# Patient Record
Sex: FEMALE | Race: BLACK OR AFRICAN AMERICAN | NOT HISPANIC OR LATINO | URBAN - METROPOLITAN AREA
[De-identification: names, ages, dates, MRNs, and addresses within clinical notes are randomized per-mention and may not be internally consistent; named-entity substitution may affect disease eponyms.]

---

## 2019-03-25 ENCOUNTER — HOSPITAL ENCOUNTER (EMERGENCY)
Facility: OTHER | Age: 64
Discharge: HOME | End: 2019-03-25
Attending: EMERGENCY MEDICINE
Payer: MEDICARE

## 2019-03-25 VITALS
HEIGHT: 65 IN | HEART RATE: 65 BPM | TEMPERATURE: 98 F | DIASTOLIC BLOOD PRESSURE: 78 MMHG | SYSTOLIC BLOOD PRESSURE: 189 MMHG | BODY MASS INDEX: 36.65 KG/M2 | WEIGHT: 220 LBS | RESPIRATION RATE: 19 BRPM | OXYGEN SATURATION: 97 %

## 2019-03-25 DIAGNOSIS — S82.892A CLOSED FRACTURE OF LEFT ANKLE, INITIAL ENCOUNTER: Primary | ICD-10-CM

## 2019-03-25 DIAGNOSIS — R03.0 ELEVATED BLOOD PRESSURE READING: ICD-10-CM
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
## 2019-03-25 PROCEDURE — 99285 EMERGENCY DEPT VISIT HI MDM: CPT | Mod: 25

## 2019-03-25 PROCEDURE — 29515 APPLICATION SHORT LEG SPLINT: CPT | Mod: LT

## 2019-03-25 PROCEDURE — 25000003 PHARM REV CODE 250: Performed by: EMERGENCY MEDICINE

## 2019-03-25 RX ORDER — ONDANSETRON 8 MG/1
8 TABLET, ORALLY DISINTEGRATING ORAL
Status: COMPLETED | OUTPATIENT
Start: 2019-03-25 | End: 2019-03-25

## 2019-03-25 RX ORDER — OXYCODONE AND ACETAMINOPHEN 5; 325 MG/1; MG/1
2 TABLET ORAL
Status: COMPLETED | OUTPATIENT
Start: 2019-03-25 | End: 2019-03-25

## 2019-03-25 RX ORDER — HYDROCODONE BITARTRATE AND ACETAMINOPHEN 5; 325 MG/1; MG/1
1 TABLET ORAL EVERY 4 HOURS PRN
Qty: 18 TABLET | Refills: 0 | Status: SHIPPED | OUTPATIENT
Start: 2019-03-25

## 2019-03-25 RX ADMIN — ONDANSETRON 8 MG: 8 TABLET, ORALLY DISINTEGRATING ORAL at 12:03

## 2019-03-25 RX ADMIN — OXYCODONE HYDROCHLORIDE AND ACETAMINOPHEN 2 TABLET: 5; 325 TABLET ORAL at 10:03

## 2019-03-25 NOTE — ED NOTES
Verbal order: Discontinue crutches per Dr. Ugalde. Pt to perform no weight bearing to LLE using walker per MD. New orders received and implemented.

## 2019-03-25 NOTE — ED PROVIDER NOTES
"Encounter Date: 3/25/2019    SCRIBE #1 NOTE: I, Arabella Guo, am scribing for, and in the presence of, Dr. Ugalde.       History     Chief Complaint   Patient presents with    Ankle Pain     slip and fall with hyperextension L foot, pain and localized edema to L ankle, says she heard a "pop"     Time seen by provider: 8:46 AM    This is a 64 y.o. female who presents with complaint of L ankle and foot pain for the past few hours. Pt states that she was sleeping on an air mattress on the ground and slipped when getting up. She states that her L leg slipped behind her and that she heard a "pop." There is pain across the dorsal aspect of the foot, the toes, and the lateral ankle, exacerbated with movement and bending the knee. Pt states that she has chronic pain to the legs from varicose veins. She has not taken any medications for relief. She denies any numbness, weakness, tingling, and wounds. She denies any head trauma or LOC.    The history is provided by the patient.     Review of patient's allergies indicates:   Allergen Reactions    Iodine and iodide containing products      No past medical history on file.  No past surgical history on file.  No family history on file.  Social History     Tobacco Use    Smoking status: Not on file   Substance Use Topics    Alcohol use: Not on file    Drug use: Not on file     Review of Systems   Constitutional: Negative for chills and fever.   Gastrointestinal: Negative for nausea and vomiting.   Musculoskeletal: Negative for back pain and neck pain.        Positive for L ankle and foot pain.   Skin: Negative for rash and wound.   Allergic/Immunologic: Negative for immunocompromised state.   Neurological: Negative for dizziness, weakness and numbness.        Negative for tingling.   Hematological: Does not bruise/bleed easily.       Physical Exam     Initial Vitals [03/25/19 0828]   BP Pulse Resp Temp SpO2   (!) 176/79 75 (!) 22 99.3 °F (37.4 °C) 98 %      MAP     "   --         Physical Exam    Nursing note and vitals reviewed.  Constitutional: She appears well-developed and well-nourished. She is not diaphoretic. No distress.   HENT:   Head: Normocephalic and atraumatic.   Right Ear: External ear normal.   Left Ear: External ear normal.   Eyes: Right eye exhibits no discharge. Left eye exhibits no discharge.   Neck: Normal range of motion. Neck supple.   Pulmonary/Chest: No respiratory distress.   Musculoskeletal: Normal range of motion.   LLE: No tenderness to proximal tibial fibular region. Swelling to both malleoli on L. Tenderness of lateral malleolus on L. No joint instability. Tenderness to dorsum of distal metatarsal region. Symmetric capillary refill and normal perfusion.    Neurological: She is alert and oriented to person, place, and time.   Skin: Skin is warm and dry. No rash noted. No erythema.   Psychiatric: She has a normal mood and affect. Her behavior is normal.         ED Course   Procedures  Labs Reviewed - No data to display       Imaging Results          X-Ray Tibia Fibula 2 View Left (Final result)  Result time 03/25/19 09:42:43    Final result by Danyell Mcfarlane MD (03/25/19 09:42:43)                 Impression:      Medial malleolar nondisplaced avulsion fracture.  Otherwise normal exam.      Electronically signed by: Danyell Mcfarlane MD  Date:    03/25/2019  Time:    09:42             Narrative:    EXAMINATION:  XR TIBIA FIBULA 2 VIEW LEFT    CLINICAL HISTORY:  Unspecified fall, initial encounter    TECHNIQUE:  AP and lateral views of the left tibia and fibula were performed.    COMPARISON:  None.    FINDINGS:  Evaluation of the left tibia and fibula again demonstrates the avulsion fracture at the base of the medial malleolus with mild associated soft tissue swelling.  Otherwise, the osseous structures, soft tissues and joint spaces are normal.                               X-Ray Foot Complete Left (Final result)  Result time 03/25/19 09:43:59     Final result by Kvng Pa MD (03/25/19 09:43:59)                 Impression:      Linear calcification identified at the dorsal aspect of the anterior talus likely representing a small avulsion fracture with associated soft tissue swelling.    Calcaneal spurs.      Electronically signed by: Kvng Pa MD  Date:    03/25/2019  Time:    09:43             Narrative:    EXAMINATION:  XR FOOT COMPLETE 3 VIEW LEFT    CLINICAL HISTORY:  .  Unspecified fall, initial encounter    TECHNIQUE:  AP, lateral and oblique views of the left foot were performed.    COMPARISON:  None    FINDINGS:  There is a thin linear calcification adjacent to the anterior aspect of the talus noted on the lateral radiograph only.  There appears to be soft tissue swelling in this region and a small avulsion fracture of the dorsal aspect of the anterior talus is suspected.  The remainder of the bones appear intact.  There is no evidence for dislocation.  There are small calcaneal spurs at the insertions of the Achilles tendon and plantar fascia.  Atherosclerotic calcification is identified within the arteries at the level of the ankle.                               X-Ray Ankle Complete Left (Final result)  Result time 03/25/19 09:00:56    Final result by Louie Rudolph MD (03/25/19 09:00:56)                 Impression:      There is mild diffuse soft tissue swelling about the ankle.  Small bony fragment at the tip of the medial malleolus may represent a small avulsion fragment versus a remote healed injury.      Electronically signed by: Louie Rudolph MD  Date:    03/25/2019  Time:    09:00             Narrative:    EXAMINATION:  XR ANKLE COMPLETE 3 VIEW LEFT    CLINICAL HISTORY:  Unspecified fall, initial encounter    TECHNIQUE:  AP, lateral and oblique views of the left ankle were performed.    COMPARISON:  None    FINDINGS:  There is mild diffuse soft tissue swelling about the ankle.  Small bony fragment at the tip of the medial malleolus may  "represent a small avulsion fragment versus a remote healed injury.  Bony spurring is present along the calcaneus.                              X-Rays:   Independently Interpreted Readings:   Other Readings:  X-Ray Ankle Complete Left: No obvious fracture or dislocation.  X-Ray Foot Complete Left: No obvious fracture or dislocation.  X-Ray Tibia Fibula 2 View Left: No obvious fracture or dislocation.    Medical Decision Making:   Initial Assessment:   65 yo F here with pain to the L ankle sp twisting injury this am. Pt reports "popping" sensation that occured when it "bent behind her" while slipping on attempts to get out of an inflatable bed. Pt denies head trauma, pain located to lateral aspect of L ankle. On exam pt has tenderness and swelling to lateral malleolus, and distal metatarsals with nml distal perfusion and neuro exam. Will obtain imaging, analgesia and reassess.   Independently Interpreted Test(s):   I have ordered and independently interpreted X-rays - see prior notes.  Clinical Tests:   Radiological Study: Ordered and Reviewed  ED Management:  XR w medial mal avulsion fx andpossible talus avulsion fx.    Pt initially placed in splint, but unable to coordinate crutch use with safe mobility. Pt then placed in walking boot with plan for minimal weight bearing and encouraged to rent a wheelchair, and follow with Orthopedics upon return home to [place], RICE education given.             Scribe Attestation:   Scribe #1: I performed the above scribed service and the documentation accurately describes the services I performed. I attest to the accuracy of the note.    Attending Attestation:           Physician Attestation for Scribe:  Physician Attestation Statement for Scribe #1: I, Dr. Ugalde, reviewed documentation, as scribed by Arabella Guo in my presence, and it is both accurate and complete.                    Clinical Impression:     1. Closed fracture of left ankle, initial encounter    2. Fall  "   3. Elevated blood pressure reading          Disposition:   Disposition: Discharged  Condition: Sade Ugalde MD  03/25/19 3288

## 2022-06-07 ENCOUNTER — OFFICE VISIT (OUTPATIENT)
Dept: URGENT CARE | Facility: CLINIC | Age: 67
End: 2022-06-07
Payer: MEDICARE

## 2022-06-07 VITALS
OXYGEN SATURATION: 96 % | DIASTOLIC BLOOD PRESSURE: 90 MMHG | RESPIRATION RATE: 16 BRPM | HEIGHT: 65 IN | HEART RATE: 84 BPM | WEIGHT: 220 LBS | TEMPERATURE: 99 F | BODY MASS INDEX: 36.65 KG/M2 | SYSTOLIC BLOOD PRESSURE: 140 MMHG

## 2022-06-07 DIAGNOSIS — M62.838 MUSCLE SPASM: ICD-10-CM

## 2022-06-07 DIAGNOSIS — M25.572 PAIN IN LEFT ANKLE AND JOINTS OF LEFT FOOT: ICD-10-CM

## 2022-06-07 DIAGNOSIS — Z87.81 HISTORY OF FRACTURE OF LEFT ANKLE: ICD-10-CM

## 2022-06-07 DIAGNOSIS — M54.42 ACUTE LEFT-SIDED LOW BACK PAIN WITH LEFT-SIDED SCIATICA: Primary | ICD-10-CM

## 2022-06-07 DIAGNOSIS — Z87.81 HISTORY OF FRACTURE OF FOOT: ICD-10-CM

## 2022-06-07 PROCEDURE — 96372 PR INJECTION,THERAP/PROPH/DIAG2ST, IM OR SUBCUT: ICD-10-PCS | Mod: S$GLB,,, | Performed by: PHYSICIAN ASSISTANT

## 2022-06-07 PROCEDURE — 99203 PR OFFICE/OUTPT VISIT, NEW, LEVL III, 30-44 MIN: ICD-10-PCS | Mod: 25,S$GLB,, | Performed by: PHYSICIAN ASSISTANT

## 2022-06-07 PROCEDURE — 99203 OFFICE O/P NEW LOW 30 MIN: CPT | Mod: 25,S$GLB,, | Performed by: PHYSICIAN ASSISTANT

## 2022-06-07 PROCEDURE — 96372 THER/PROPH/DIAG INJ SC/IM: CPT | Mod: S$GLB,,, | Performed by: PHYSICIAN ASSISTANT

## 2022-06-07 RX ORDER — DULOXETIN HYDROCHLORIDE 30 MG/1
30 CAPSULE, DELAYED RELEASE ORAL DAILY
COMMUNITY
Start: 2022-03-25

## 2022-06-07 RX ORDER — METOPROLOL TARTRATE 100 MG/1
100 TABLET ORAL 2 TIMES DAILY
COMMUNITY
Start: 2022-03-25

## 2022-06-07 RX ORDER — HYDROCHLOROTHIAZIDE 25 MG/1
25 TABLET ORAL DAILY
COMMUNITY
Start: 2022-03-25

## 2022-06-07 RX ORDER — DIAZEPAM 10 MG/1
10 TABLET ORAL NIGHTLY
COMMUNITY
Start: 2022-04-28

## 2022-06-07 RX ORDER — HYDRALAZINE HYDROCHLORIDE 100 MG/1
100 TABLET, FILM COATED ORAL 2 TIMES DAILY
COMMUNITY
Start: 2022-04-09

## 2022-06-07 RX ORDER — FLUTICASONE PROPIONATE 50 MCG
1 SPRAY, SUSPENSION (ML) NASAL
COMMUNITY

## 2022-06-07 RX ORDER — TRAVOPROST OPHTHALMIC SOLUTION 0.04 MG/ML
1 SOLUTION OPHTHALMIC NIGHTLY
COMMUNITY
Start: 2022-05-22

## 2022-06-07 RX ORDER — LISINOPRIL 40 MG/1
40 TABLET ORAL DAILY
COMMUNITY
Start: 2022-03-25

## 2022-06-07 RX ORDER — TIZANIDINE 2 MG/1
4 TABLET ORAL EVERY 8 HOURS PRN
COMMUNITY
Start: 2022-03-25

## 2022-06-07 RX ORDER — TIZANIDINE 4 MG/1
4 TABLET ORAL
Qty: 20 TABLET | Refills: 0 | Status: SHIPPED | OUTPATIENT
Start: 2022-06-07 | End: 2022-06-17

## 2022-06-07 RX ORDER — KETOROLAC TROMETHAMINE 30 MG/ML
30 INJECTION, SOLUTION INTRAMUSCULAR; INTRAVENOUS
Status: COMPLETED | OUTPATIENT
Start: 2022-06-07 | End: 2022-06-07

## 2022-06-07 RX ORDER — NAPROXEN 500 MG/1
500 TABLET ORAL 2 TIMES DAILY PRN
Qty: 28 TABLET | Refills: 0 | Status: SHIPPED | OUTPATIENT
Start: 2022-06-08 | End: 2022-06-22

## 2022-06-07 RX ORDER — AZELASTINE HYDROCHLORIDE, FLUTICASONE PROPIONATE 137; 50 UG/1; UG/1
1 SPRAY, METERED NASAL
COMMUNITY

## 2022-06-07 RX ADMIN — KETOROLAC TROMETHAMINE 30 MG: 30 INJECTION, SOLUTION INTRAMUSCULAR; INTRAVENOUS at 04:06

## 2022-06-07 NOTE — PATIENT INSTRUCTIONS
PLEASE READ YOUR DISCHARGE INSTRUCTIONS ENTIRELY AS IT CONTAINS IMPORTANT INFORMATION.  - OTC Tylenol/anti-inflammatory as needed for pain (see below). These medications can be obtained over the counter at any local pharmacy without requiring a prescription.   OTC ORAL medications for pain reliever or fever reducing:  - Acetaminophen (tylenol 650mg every 8 hour as needed with food) or Ibuprofen (advil,motrin 400-600mg every 8 hour with food as needed) as directed as needed for fever/pain. Avoid tylenol if you have a history of liver disease or allergic reactions. Do not take ibuprofen if you have a history of allergic reactions, stomach bleeding ulcers, kidney disease, or if you take blood thinners.  -The patient was advised that NSAID-type medications have two very important potential side effects: gastrointestinal irritation including hemorrhage and renal injuries. patient was asked to take the medication with food and to stop if patient experiences any GI upset. I asked patient to call for vomiting, abdominal pain or black/bloody stools. The patient expresses understanding of these issues and all questions were answered.    OTC TOPICAL meds for pain reliever :  -You can apply OTC diclofenac or Volatren Gel 2-3 times daily to muscle or joints.  -You can also apply OTC lidocaine with menthol ointment 2-3 daily to muscle or joints.  -Please let one absorb before using the other. Do not use both at the same time as it may decrease efficacy. Please stop using if you develop any skin rash or irritation.  -Please wash your hands after application of these topical products.     - do not use OTC anti-inflammatory if taking prescribed (Rx) anti-inflammatory; start Rx inflammatory tomorrow.    Because you were given a concentrated anti-inflammatory injection in clinic.    - no operating machinery with muscle relaxant as it may cause drowsiness.  - can continue OTC compression stockings and leg elevation as needed to help  with your symptoms.    - continue heat (muscle) /ice (bone/joint) compression, rice therapy, and muscle stretches.   - Radiographs and all diagnostic testing reviewed with patient  - if no improvement or worsening symptoms, recommend follow-up with *PCP for further evaluation.  Please call the number below to set up appointment; if a referral has been placed.    - discussed weight loss given obesity  -You must understand that you've received an Urgent Care treatment only and that you may be released before all your medical problems are known or treated. You, the patient, will arrange for follow up care as instructed. Please arrange follow up with your primary medical clinic within 2-5 days if your signs and symptoms have not resolved or worsen.   - Follow up with your PCP or specialty clinic as directed.  You can call [phone] or [phone] to schedule an appointment with the appropriate provider.    - If your condition worsens or fails to improve we recommend that you receive another evaluation at the emergency room immediately or contact your primary medical clinic to discuss your concerns in next 2-5 days.  Strict ER versus clinic precautions given.      RED FLAGS/WARNING SYMPTOMS DISCUSSED WITH PATIENT THAT WOULD WARRANT EMERGENT MEDICAL ATTENTION. Patient aware and verbalized understanding.      RICE     Rest an injury, elevate it, and use ice and compression as directed.   RICE stands for rest, ice, compression, and elevation. These can limit pain and swelling after an injury. RICE may be recommended to help treat fractures, sprains, strains, and bruises or bumps.   Home care  The following explain the details of RICE:  Rest. Limit the use of the injured body part. This helps prevent further damage to the body part and gives it time to heal. In some cases, you may need a sling, brace, splint, or cast to help keep the body part still until it has healed.  Ice. Applying ice right after an injury helps  relieve pain and swelling. Wrap a bag of ice in a thin towel. Then, place it over the injured area. Do this for 10 to 15 minutes every 3 to 4 hours. Continue for the next 1 to 3 days or until your symptoms improve. Never put ice directly on your skin or ice an area longer than 15 minutes at a time.  Compression. Putting pressure on an injury helps reduce swelling and provides support. Wrap the injured area firmly with an elastic bandage/wrap. Make sure not to wrap the bandage too tightly or you will cut off blood flow to the injured area. If your bandage loosens, rewrap it.  Elevation. Keeping an injury raised above the level of your heart reduces swelling, pain, and throbbing. For instance, if you have a broken leg, it may help to rest your leg on several pillows when sitting or lying down. Try to keep the injured area elevated for at least 2 to 3 hours per day.  Follow-up care  Follow up with your healthcare provider, or as advised.  When to seek medical advice  Call your healthcare provider right away if any of these occur:  Fever of 100.4°F (38°C) or higher, or as directed by your healthcare provider  Increased pain or swelling in the injured body part  Injured body part becomes cold, blue, numb, or tingly  Signs of infection. These include warmth in the skin, redness, drainage, or bad smell coming from the injured body part.  Date Last Reviewed: 1/18/2016  © 2175-3765 [employer]. [address] [place] [address], [place] [address]. All rights reserved. This information is not intended as a substitute for professional medical care. Always follow your healthcare professional's instructions.

## 2022-06-07 NOTE — PROGRESS NOTES
"Subjective:       Patient ID: Pati Salter is a 67 y.o. female.    Vitals:  height is 5' 5" (1.651 m) and weight is 99.8 kg (220 lb). Her oral temperature is 99 °F (37.2 °C). Her blood pressure is 140/90 (abnormal) and her pulse is 84. Her respiration is 16 and oxygen saturation is 96%.     Chief Complaint: Foot Swelling      67-year-old female with a history of obesity, previous brain aneurysm 30 years ago, seizures on occiput hardware malpositioning daily, cervical disc disease/radiculopathy, chronic low back pain with sciatica, previous colon cancer status post resection, previous left foot and ankle fracture 2019, and obstructive sleep apnea who presents to urgent care clinic for evaluation.  She is here visiting from [place].  She has tons of family and sister and wheezing in a. She is here with her .   states that patient has been standing and walking excessively which is causing her symptoms.  Pain started over the weekend/Saturday.  Since then she has been resting and elevating her legs.  She has not taking anything for symptoms.  She is complaining of multiple chronic pain including left low back buttocks pain radiating posteriorly down into her left lateral ankle.  She also has chronic left ankle and foot pain from previous fracture.  She feels like her left ankle is more swollen than her right ankle.  There is associated numbness/tingling sensation and her left leg into her left foot from her previous chronic nerve damage and sciatica.  Pain exacerbates with or standing.  Pain improves with rest.  She will eventually return back to [place] after her vacation.  Reports does take duloxetine/Cymbalta for her chronic nerve damage pain.  Denies any GI ulcers, kidney/liver disease, or cardiac disease.  Denies any anti-inflammatory allergies.  States that she was prescribed meloxicam in the past but did not work.  Denies any injury, falls, chest pain, shortness of breath, URI symptoms, urinary " symptoms, bladder bowel incontinence, saddle anesthesia, rash, lesions, or other complaints.     Medical assistant notes:   Patient stated her left foot swelling started a few weeks ago but her left foot pain started 2 days ago.  She stated it hurts to walk on it.  She has a lot of swelling in her left ankle.  She is not bale to bend her ankle.  She has numbness and tingling to her left foot.  She rated her pain level a 10 today.  She has been on her feet more than usual and that's when she noticed the swelling in her left ankle.     Other  This is a new problem. The current episode started in the past 7 days. The problem has been unchanged. Associated symptoms include arthralgias, joint swelling, myalgias and numbness. Pertinent negatives include no abdominal pain, chest pain, chills, congestion, coughing, diaphoresis, fatigue, fever, headaches, nausea, neck pain, rash, sore throat, vertigo, vomiting or weakness. Associated symptoms comments: Tingling . The symptoms are aggravated by bending, standing and walking. She has tried rest for the symptoms. The treatment provided mild relief.       Constitution: Negative for activity change, appetite change, chills, sweating, fatigue, fever and generalized weakness.   HENT: Negative for ear pain, hearing loss, facial swelling, congestion, postnasal drip, sinus pain, sinus pressure, sore throat, trouble swallowing and voice change.    Neck: Negative for neck pain, neck stiffness and painful lymph nodes.   Cardiovascular: Negative for chest pain, leg swelling, palpitations, sob on exertion and passing out.   Eyes: Negative for eye discharge, eye pain, photophobia, vision loss, double vision and blurred vision.   Respiratory: Negative for chest tightness, cough, sputum production, bloody sputum, COPD, shortness of breath, stridor, wheezing and asthma.    Gastrointestinal: Negative for abdominal pain, nausea, vomiting, constipation, diarrhea, bright red blood in stool, rectal  bleeding, heartburn and bowel incontinence.   Genitourinary: Negative for dysuria, frequency, urgency, urine decreased, flank pain, bladder incontinence and hematuria.   Musculoskeletal: Positive for joint pain, joint swelling, arthritis, back pain and muscle ache. Negative for trauma, abnormal ROM of joint and muscle cramps.   Skin: Negative for color change, pale, rash and wound.   Allergic/Immunologic: Negative for seasonal allergies, asthma and immunocompromised state.   Neurological: Positive for numbness. Negative for dizziness, history of vertigo, light-headedness, passing out, facial drooping, speech difficulty, coordination disturbances, loss of balance, headaches, disorientation, altered mental status, loss of consciousness, tingling and seizures.   Hematologic/Lymphatic: Negative for swollen lymph nodes, easy bruising/bleeding and trouble clotting. Does not bruise/bleed easily.   Psychiatric/Behavioral: Negative for altered mental status and disorientation.       Past Medical History:   Diagnosis Date    Colon cancer 2020       Objective:      Physical Exam   Constitutional: She appears well-developed. She is cooperative.  Non-toxic appearance. She does not appear ill. No distress.   HENT:   Head: Normocephalic and atraumatic.   Ears:   Right Ear: Hearing, external ear and ear canal normal.   Left Ear: Hearing, external ear and ear canal normal.   Nose: Nose normal.   Mouth/Throat: Uvula is midline, oropharynx is clear and moist and mucous membranes are normal. Mucous membranes are moist. No posterior oropharyngeal edema. No tonsillar exudate. Oropharynx is clear.   Eyes: Conjunctivae, EOM and lids are normal. Pupils are equal, round, and reactive to light. Right eye exhibits no discharge. Left eye exhibits no discharge. Extraocular movement intact   Neck: Neck supple. No neck rigidity present.   Cardiovascular: Normal rate, regular rhythm and normal pulses.   Pulmonary/Chest: Effort normal. No accessory  muscle usage. No respiratory distress. She has no wheezes. She exhibits no tenderness.   Abdominal: Normal appearance. She exhibits no distension. Soft. There is no abdominal tenderness.   Musculoskeletal: Normal range of motion.         General: No deformity or signs of injury. Normal range of motion.      Right lower leg: No edema.      Left lower leg: No edema.      Comments: Spinal exam:   Moves all extremities with good strength 5/5  BUE- deltoid, triceps, biceps, wrist ext/flexion, hand , and interossei  BLE- hip flexion, knee ext/flexion, dorsiflexion, plantar flexion, EHL, ankle inversion, and ankle eversion    Gait antalgic    Negative straight leg raise or clonus     Sensation intact to light touch throughout.  2+ DTR bilaterally.    Full back ROM; pain with all ROM  Full neck ROM; no pain with all ROM.    Negative Lhermitte's or Spurling's    There is no tenderness to palpation of midline spine or paraspinal muscles.  There is no bony step-off or bony tenderness to palpation.    There is muscle spasms present and left low back, left buttock, and left leg.     No tenderness to palpation of bilateral SI joint or trochanteric bursa  No pain on hip ROM.   Anirudh's test negative        Ortho exam:  Bilateral knee joint spaces with no warmth erythema, edema, or tenderness to palpation.  Full ROM with weight-bearing.  No pain or weakness with valgus/varus maneuvers.        Bilateral ankle joint spaces with no warmth or erythema.  There is bilateral ankle nonpitting edema present.  Generalized left foot and ankle tenderness to palpation.  Full ROM with weight-bearing; pain with all maneuvers on left ankle.  No laxity present.     Neurological: no focal deficit. She is alert and at baseline. She has normal motor skills, normal sensation and intact cranial nerves. She displays no weakness, facial symmetry and normal reflexes. No cranial nerve deficit or sensory deficit. She exhibits normal muscle tone. Gait  and coordination normal. Coordination normal.   Skin: Skin is warm, dry, not diaphoretic and no rash. Capillary refill takes less than 2 seconds.        Psychiatric: Her behavior is normal. Mood and thought content normal.   Nursing note and vitals reviewed.            Assessment:       1. Acute left-sided low back pain with left-sided sciatica    2. Pain in left ankle and joints of left foot    3. Muscle spasm    4. History of fracture of left ankle    5. History of fracture of foot        On exam, patient is nontoxic appearing and vitals are stable.  Patient is essentially neurovascularly intact on exam. No injury/trauma and no concern for acute fracture or dislocation.  She has chronic pain, sciatica, arthritis, and previous left foot and ankle fracture.  Patient was given Toradol injection clinic for the pain. Patient was prescribed muscle relaxants, anti-inflammatory, and recommended OTC treatments/compression stocking for their symptoms. Patient was treated conservatively with activity modification, OTC pain reliever, muscle stretches, and RICE therapy. If symptoms do not improve/worsens, patient was referred back to PCP for continued outpatient workup and management.     Discussed diet, exercise, and weight loss given their obesity.    Patient was instructed to return for re-evaluation for any worsening or change in current symptoms. Strict ED versus clinic precautions given in depth. Discharge and follow-up instructions given verbally/printed with the patient who expressed understanding and willingness to comply with my recommendations.  Patient verbalized understanding and agreed with the entirety of plan of care.    Note dictated with voice recognition software, please excuse any grammatical errors.    Plan:         Acute left-sided low back pain with left-sided sciatica  -     ketorolac injection 30 mg  -     tiZANidine (ZANAFLEX) 4 MG tablet; Take 1 tablet (4 mg total) by mouth every 6 to 8 hours as  needed (muscle spasms).  Dispense: 20 tablet; Refill: 0  -     naproxen (NAPROSYN) 500 MG tablet; Take 1 tablet (500 mg total) by mouth 2 (two) times daily as needed (Take with food).  Dispense: 28 tablet; Refill: 0    Pain in left ankle and joints of left foot  -     ketorolac injection 30 mg  -     tiZANidine (ZANAFLEX) 4 MG tablet; Take 1 tablet (4 mg total) by mouth every 6 to 8 hours as needed (muscle spasms).  Dispense: 20 tablet; Refill: 0  -     naproxen (NAPROSYN) 500 MG tablet; Take 1 tablet (500 mg total) by mouth 2 (two) times daily as needed (Take with food).  Dispense: 28 tablet; Refill: 0    Muscle spasm    History of fracture of left ankle    History of fracture of foot              Additional MDM:     Heart Failure Score:   COPD = No      Patient Instructions     PLEASE READ YOUR DISCHARGE INSTRUCTIONS ENTIRELY AS IT CONTAINS IMPORTANT INFORMATION.  - OTC Tylenol/anti-inflammatory as needed for pain (see below). These medications can be obtained over the counter at any local pharmacy without requiring a prescription.   OTC ORAL medications for pain reliever or fever reducing:  - Acetaminophen (tylenol 650mg every 8 hour as needed with food) or Ibuprofen (advil,motrin 400-600mg every 8 hour with food as needed) as directed as needed for fever/pain. Avoid tylenol if you have a history of liver disease or allergic reactions. Do not take ibuprofen if you have a history of allergic reactions, stomach bleeding ulcers, kidney disease, or if you take blood thinners.  -The patient was advised that NSAID-type medications have two very important potential side effects: gastrointestinal irritation including hemorrhage and renal injuries. patient was asked to take the medication with food and to stop if patient experiences any GI upset. I asked patient to call for vomiting, abdominal pain or black/bloody stools. The patient expresses understanding of these issues and all questions were answered.    OTC TOPICAL  meds for pain reliever :  -You can apply OTC diclofenac or Volatren Gel 2-3 times daily to muscle or joints.  -You can also apply OTC lidocaine with menthol ointment 2-3 daily to muscle or joints.  -Please let one absorb before using the other. Do not use both at the same time as it may decrease efficacy. Please stop using if you develop any skin rash or irritation.  -Please wash your hands after application of these topical products.     - do not use OTC anti-inflammatory if taking prescribed (Rx) anti-inflammatory; start Rx inflammatory tomorrow.    Because you were given a concentrated anti-inflammatory injection in clinic.    - no operating machinery with muscle relaxant as it may cause drowsiness.  - can continue OTC compression stockings and leg elevation as needed to help with your symptoms.    - continue heat (muscle) /ice (bone/joint) compression, rice therapy, and muscle stretches.   - Radiographs and all diagnostic testing reviewed with patient  - if no improvement or worsening symptoms, recommend follow-up with *PCP for further evaluation.  Please call the number below to set up appointment; if a referral has been placed.    - discussed weight loss given obesity  -You must understand that you've received an Urgent Care treatment only and that you may be released before all your medical problems are known or treated. You, the patient, will arrange for follow up care as instructed. Please arrange follow up with your primary medical clinic within 2-5 days if your signs and symptoms have not resolved or worsen.   - Follow up with your PCP or specialty clinic as directed.  You can call [phone] or [phone] to schedule an appointment with the appropriate provider.    - If your condition worsens or fails to improve we recommend that you receive another evaluation at the emergency room immediately or contact your primary medical clinic to discuss your concerns in next 2-5 days.  Strict ER versus clinic  precautions given.      RED FLAGS/WARNING SYMPTOMS DISCUSSED WITH PATIENT THAT WOULD WARRANT EMERGENT MEDICAL ATTENTION. Patient aware and verbalized understanding.      RICE     Rest an injury, elevate it, and use ice and compression as directed.   RICE stands for rest, ice, compression, and elevation. These can limit pain and swelling after an injury. RICE may be recommended to help treat fractures, sprains, strains, and bruises or bumps.   Home care  The following explain the details of RICE:  · Rest. Limit the use of the injured body part. This helps prevent further damage to the body part and gives it time to heal. In some cases, you may need a sling, brace, splint, or cast to help keep the body part still until it has healed.  · Ice. Applying ice right after an injury helps relieve pain and swelling. Wrap a bag of ice in a thin towel. Then, place it over the injured area. Do this for 10 to 15 minutes every 3 to 4 hours. Continue for the next 1 to 3 days or until your symptoms improve. Never put ice directly on your skin or ice an area longer than 15 minutes at a time.  · Compression. Putting pressure on an injury helps reduce swelling and provides support. Wrap the injured area firmly with an elastic bandage/wrap. Make sure not to wrap the bandage too tightly or you will cut off blood flow to the injured area. If your bandage loosens, rewrap it.  · Elevation. Keeping an injury raised above the level of your heart reduces swelling, pain, and throbbing. For instance, if you have a broken leg, it may help to rest your leg on several pillows when sitting or lying down. Try to keep the injured area elevated for at least 2 to 3 hours per day.  Follow-up care  Follow up with your healthcare provider, or as advised.  When to seek medical advice  Call your healthcare provider right away if any of these occur:  · Fever of 100.4°F (38°C) or higher, or as directed by your healthcare provider  · Increased pain or swelling  in the injured body part  · Injured body part becomes cold, blue, numb, or tingly  · Signs of infection. These include warmth in the skin, redness, drainage, or bad smell coming from the injured body part.  Date Last Reviewed: 1/18/2016 © 2000-2017 [employer]. [address] [place] [id number]. All rights reserved. This information is not intended as a substitute for professional medical care. Always follow your healthcare professional's instructions.